# Patient Record
Sex: FEMALE | Race: OTHER | HISPANIC OR LATINO
[De-identification: names, ages, dates, MRNs, and addresses within clinical notes are randomized per-mention and may not be internally consistent; named-entity substitution may affect disease eponyms.]

---

## 2017-01-13 ENCOUNTER — TRANSCRIPTION ENCOUNTER (OUTPATIENT)
Age: 62
End: 2017-01-13

## 2020-07-14 ENCOUNTER — TRANSCRIPTION ENCOUNTER (OUTPATIENT)
Age: 65
End: 2020-07-14

## 2020-10-06 ENCOUNTER — APPOINTMENT (OUTPATIENT)
Dept: OBGYN | Facility: CLINIC | Age: 65
End: 2020-10-06
Payer: MEDICAID

## 2020-10-06 ENCOUNTER — LABORATORY RESULT (OUTPATIENT)
Age: 65
End: 2020-10-06

## 2020-10-06 VITALS
BODY MASS INDEX: 27.48 KG/M2 | HEIGHT: 60 IN | SYSTOLIC BLOOD PRESSURE: 134 MMHG | WEIGHT: 140 LBS | DIASTOLIC BLOOD PRESSURE: 86 MMHG

## 2020-10-06 PROBLEM — Z00.00 ENCOUNTER FOR PREVENTIVE HEALTH EXAMINATION: Status: ACTIVE | Noted: 2020-10-06

## 2020-10-06 PROCEDURE — 99386 PREV VISIT NEW AGE 40-64: CPT

## 2020-10-06 RX ORDER — LOSARTAN POTASSIUM 50 MG/1
50 TABLET, FILM COATED ORAL
Refills: 0 | Status: ACTIVE | COMMUNITY

## 2020-10-06 NOTE — COUNSELING
[Nutrition/ Exercise/ Weight Management] : nutrition, exercise, weight management [Body Image] : body image [Vitamins/Supplements] : vitamins/supplements [Breast Self Exam] : breast self exam [Bladder Hygiene] : bladder hygiene [STD (testing, results, tx)] : STD (testing, results, tx) [Vaccines] : vaccines

## 2020-10-06 NOTE — HISTORY OF PRESENT ILLNESS
[TextBox_4] : Patient is a 65 year old female who presents for her annual exam.  Reports no postmenopausal bleeding, abdominal or pelvic pain, change in discharge, change in bowel or bladder habits or any other concerns.  Due for pap and mammo.

## 2022-05-16 ENCOUNTER — EMERGENCY (EMERGENCY)
Facility: HOSPITAL | Age: 67
LOS: 1 days | Discharge: HOME | End: 2022-05-16
Attending: EMERGENCY MEDICINE
Payer: MEDICARE

## 2022-05-16 VITALS
DIASTOLIC BLOOD PRESSURE: 69 MMHG | SYSTOLIC BLOOD PRESSURE: 106 MMHG | WEIGHT: 132.06 LBS | TEMPERATURE: 98 F | HEART RATE: 64 BPM | RESPIRATION RATE: 16 BRPM | OXYGEN SATURATION: 98 % | HEIGHT: 61 IN

## 2022-05-16 PROCEDURE — 73110 X-RAY EXAM OF WRIST: CPT | Mod: 26,LT

## 2022-05-16 PROCEDURE — 73110 X-RAY EXAM OF WRIST: CPT

## 2022-05-16 PROCEDURE — 73100 X-RAY EXAM OF WRIST: CPT

## 2022-05-16 PROCEDURE — 73100 X-RAY EXAM OF WRIST: CPT | Mod: 26,59,LT

## 2022-05-16 PROCEDURE — 25605 CLTX DST RDL FX/EPHYS SEP W/: CPT | Mod: LT

## 2022-05-16 PROCEDURE — 25605 CLTX DST RDL FX/EPHYS SEP W/: CPT | Mod: 54,LT

## 2022-05-16 PROCEDURE — 99284 EMERGENCY DEPT VISIT MOD MDM: CPT | Mod: 57

## 2022-05-16 PROCEDURE — 99285 EMERGENCY DEPT VISIT HI MDM: CPT | Mod: 25

## 2022-05-16 RX ORDER — ACETAMINOPHEN 500 MG
650 TABLET ORAL ONCE
Refills: 0 | Status: COMPLETED | OUTPATIENT
Start: 2022-05-16 | End: 2022-05-16

## 2022-05-16 RX ADMIN — Medication 650 MILLIGRAM(S): at 11:37

## 2022-05-16 NOTE — ED PROVIDER NOTE - CLINICAL SUMMARY MEDICAL DECISION MAKING FREE TEXT BOX
67 year old female with S/P mechanical fall. Low suspicion of compression fracture. High suspicion of left wrist fracture. Neurovascular intact. Will perform X-Ray and reduction splint.

## 2022-05-16 NOTE — ED ADULT NURSE NOTE - OBJECTIVE STATEMENT
pt is a 68 y/o  female with c/o   left wrist pain s/p fall in a ladder . pt  states  she  hurt herself , denies any  LOC  with left wrist  injury and pain. + sensation but w/ limited  ROM on her left wrist.

## 2022-05-16 NOTE — ED PROVIDER NOTE - CARE PROVIDER_API CALL
Rg Thompson)  Orthopaedic Surgery; Sports Medicine  [address] [place] [address], [address] [place] [address]  Phone: [phone]  Fax: [phone]  Follow Up Time:

## 2022-05-16 NOTE — ED PROVIDER NOTE - NS ED ATTENDING STATEMENT MOD
This was a shared visit with the RAFAL. I reviewed and verified the documentation and independently performed the documented:

## 2022-05-16 NOTE — ED PROVIDER NOTE - MUSCULOSKELETAL, MLM
Neck supple, full range of motion. No spinal/paraspinal tenderness. Left wrist with deformity and distal radial tenderness. Radial/ulnar pulses intact 2+. Cap refill <2 seconds. Left elbow and left shoulder full range of motion without swelling or tenderness.

## 2022-05-16 NOTE — ED PROVIDER NOTE - PATIENT PORTAL LINK FT
You can access the FollowMyHealth Patient Portal offered by [facility] by registering at the following website: [email or web address]. By joining [employer]’s FollowMyHealth portal, you will also be able to view your health information using other applications (apps) compatible with our system.

## 2022-05-16 NOTE — ED PROVIDER NOTE - PROGRESS NOTE DETAILS
XR shows displaced fracture. Discussed with ortho. Will come down for reduction. Repeat Xr shows improved alignment. Splint on. Feels better. Will dc with ortho follow up within 3-5 days. Pt is well appearing walking with steady gait, stable for discharge and follow up without fail with medical doctor. I had a detailed discussion with the patient and/or guardian regarding the historical points, exam findings, and any diagnostic results supporting the discharge diagnosis. Pt educated on care and need for follow up. Strict return instructions and red flag signs and symptoms discussed with patient. Questions answered. Pt shows understanding of discharge information and agrees to follow.

## 2022-05-16 NOTE — ED PROVIDER NOTE - OBJECTIVE STATEMENT
67 year old female with history of hypertension brought into ED by daughter for evaluation S/P fall. She reports that she was climbing down from a stepladder when she fell backwards, landing on the buttocks. She further reports left back FOOSH, though she denies any head injury or loss of consciousness. She is now complaining of localized sharp pain to the left wrist. Patient states that she took ibuprofen 600mg prior to arrival. Denies numbness, tingling, focal weakness, back pain, or other complaints. NKDA.

## 2022-05-16 NOTE — ED PROVIDER NOTE - ATTENDING APP SHARED VISIT CONTRIBUTION OF CARE
l wrist fracture requires reduction splinting and probable surg by orthopedics - will refer. no other injury

## 2022-05-16 NOTE — ED PROVIDER NOTE - NSFOLLOWUPINSTRUCTIONS_ED_ALL_ED_FT
Follow up with the orthopedist within 3-5 days.    Tylenol 1000 mg orally every 6 hours as needed for pain/fever (max 4000 mg in 24 hours)   For pain you can take over the counter Ibuprofen 600 mg orally every 6 hours as needed for pain. Take medication with food.     If you experience any new or worsening symptoms or if you are concerned you can always come back to the emergency for a re-evaluation.

## 2022-05-16 NOTE — ED ADULT TRIAGE NOTE - CHIEF COMPLAINT QUOTE
s/p fall with injury to left wrist. "I climbed a 6 step ladder to reach something and fell around 0830".

## 2022-05-16 NOTE — ED ADULT NURSE REASSESSMENT NOTE - NS ED NURSE REASSESS COMMENT FT1
1417 pm _  MD Joshi and [facility] San   reduced left  wrist  and cast applied t pt left arm pt  able to tolerated    well.

## 2022-05-17 PROBLEM — I10 ESSENTIAL (PRIMARY) HYPERTENSION: Chronic | Status: ACTIVE | Noted: 2022-05-16

## 2022-05-18 ENCOUNTER — EMERGENCY (EMERGENCY)
Facility: HOSPITAL | Age: 67
LOS: 1 days | Discharge: HOME | End: 2022-05-18
Attending: EMERGENCY MEDICINE
Payer: MEDICARE

## 2022-05-18 VITALS
SYSTOLIC BLOOD PRESSURE: 115 MMHG | HEART RATE: 67 BPM | OXYGEN SATURATION: 97 % | TEMPERATURE: 98 F | HEIGHT: 61 IN | RESPIRATION RATE: 16 BRPM | DIASTOLIC BLOOD PRESSURE: 72 MMHG

## 2022-05-18 PROCEDURE — 99282 EMERGENCY DEPT VISIT SF MDM: CPT

## 2022-05-18 NOTE — ED PROVIDER NOTE - CLINICAL SUMMARY MEDICAL DECISION MAKING FREE TEXT BOX
pt with tight splint, will loose & d/c home.  Spoke with ortho, will not place cast if hand is swollen

## 2022-05-18 NOTE — ED PROVIDER NOTE - OBJECTIVE STATEMENT
67 y.o. female fell off the ladder on Mon, sustained distal wrist fracture, splint applied.  Pt now c/o hand swelling, 2nd-4th fingers tingling.  Pt has an appt with ortho tomorrow

## 2022-05-18 NOTE — ED PROVIDER NOTE - MUSCULOSKELETAL, MLM
Spine appears normal, RUE-range of motion is limited-sugar tong splint noted, hand- edema, pulses intact, sensory intact Spine appears normal, RUE-range of motion is limited-sugar tong splint noted, hand- edema, pulses intact, sensory intact, no cyanosis

## 2022-05-18 NOTE — ED PROVIDER NOTE - PATIENT PORTAL LINK FT
You can access the FollowMyHealth Patient Portal offered by [facility] by registering at the following website: [email or web address]. By joining [employer]’s FollowMyHealth portal, you will also be able to view your health information using other applications (apps) compatible with our system.

## 2022-05-18 NOTE — ED ADULT NURSE NOTE - CAS TRG GEN SKIN CONDITION

## 2022-05-19 ENCOUNTER — APPOINTMENT (OUTPATIENT)
Age: 67
End: 2022-05-19

## 2022-05-19 VITALS — SYSTOLIC BLOOD PRESSURE: 139 MMHG | HEART RATE: 57 BPM | DIASTOLIC BLOOD PRESSURE: 81 MMHG

## 2022-05-19 PROCEDURE — 25600 CLTX DST RDL FX/EPHYS SEP WO: CPT | Mod: LT

## 2022-05-19 PROCEDURE — 99205 OFFICE O/P NEW HI 60 MIN: CPT | Mod: 25

## 2022-05-19 NOTE — HISTORY OF PRESENT ILLNESS
[de-identified] : Ms. PAUL BURNS is a 67 year woman presents today for left wrist injury on 5/16/22. Fell backwards of a ladder and landed on her left wrist.  She initially went to [facility] where she was placed in a sugar-tong splint.  She denies any numbness or tingling in her fingers.  She is here today with her daughter.\par

## 2022-05-19 NOTE — DISCUSSION/SUMMARY
[de-identified] : 67-year-old woman with left extra-articular distal radius fracture\par -The risks and benefits of operative versus nonoperative management were discussed at length with the patient. The patient shows a good understanding of the injury and treatment options. They would like to move forward with nonoperative treatment. \par -Nonweightbearing left upper extremity\par -Short arm cast left wrist, cast care instructions were reviewed with the patient and the patient's daughter\par -Range of motion fingers elbow and shoulder.  Exercises were demonstrated in the office\par -Tylenol for pain\par -Follow-up in 6 weeks with x-rays of the left wrist out of the cast\par -All of the patient's questions and concerns were addressed.\par

## 2022-05-19 NOTE — PHYSICAL EXAM
[de-identified] : The patient is sitting comfortably in the exam room. \par left wrist.\par -Skin is intact, swelling, ecchymosis\par -Pronation 45, supination 45\par -Tenderness to palpation over the distal radius\par -Able to make a fist\par -Sensation is intact median, radial, ulnar, axillary nerves\par -Motor is intact median, radial, ulnar, axillary nerves\par -Hand is warm and well-perfused, Palpable radial and ulnar pulses [de-identified] : X-rays of the left wrist from 5/16/2022 show an extra-articular distal radius fracture with good overall height and inclination.  There is some dorsal angulation on the lateral view.  The patient is in a splint\par \par New x-rays of the left wrist were taken today in the office following application of short arm cast.  X-rays show good overall alignment of the distal radius without displacement from previous images.  There is still good radial height and inclination.  On the lateral there is still some dorsal angulation.

## 2022-06-30 ENCOUNTER — APPOINTMENT (OUTPATIENT)
Dept: ORTHOPEDIC SURGERY | Facility: CLINIC | Age: 67
End: 2022-06-30

## 2022-06-30 DIAGNOSIS — S52.502A UNSPECIFIED FRACTURE OF THE LOWER END OF LEFT RADIUS, INITIAL ENCOUNTER FOR CLOSED FRACTURE: ICD-10-CM

## 2022-06-30 PROCEDURE — 99213 OFFICE O/P EST LOW 20 MIN: CPT | Mod: 1L,24

## 2022-06-30 PROCEDURE — 73110 X-RAY EXAM OF WRIST: CPT | Mod: 1L,LT

## 2022-09-01 ENCOUNTER — NON-APPOINTMENT (OUTPATIENT)
Age: 67
End: 2022-09-01

## 2022-09-01 ENCOUNTER — APPOINTMENT (OUTPATIENT)
Dept: ORTHOPEDIC SURGERY | Facility: CLINIC | Age: 67
End: 2022-09-01

## 2022-09-01 DIAGNOSIS — S52.572D OTHER INTRAARTICULAR FRACTURE OF LOWER END OF LEFT RADIUS, SUBSEQUENT ENCOUNTER FOR CLOSED FRACTURE WITH ROUTINE HEALING: ICD-10-CM

## 2022-09-15 ENCOUNTER — APPOINTMENT (OUTPATIENT)
Dept: ORTHOPEDIC SURGERY | Facility: CLINIC | Age: 67
End: 2022-09-15

## 2022-09-15 PROBLEM — S52.572D OTHER CLOSED INTRA-ARTICULAR FRACTURE OF DISTAL END OF LEFT RADIUS WITH ROUTINE HEALING, SUBSEQUENT ENCOUNTER: Status: ACTIVE | Noted: 2022-05-19

## 2022-09-15 NOTE — PHYSICAL EXAM
[de-identified] : The patient is sitting comfortably in the exam room. \par left wrist.\par -Skin is intact, swelling, ecchymosis\par -Pronation 45, supination 45\par -Tenderness to palpation over the distal radius\par -Able to make a fist\par -Sensation is intact median, radial, ulnar, axillary nerves\par -Motor is intact median, radial, ulnar, axillary nerves\par -Hand is warm and well-perfused, Palpable radial and ulnar pulses [de-identified] : Xrays of the left wrist were taken in the office today, 9/1/22.

## 2022-09-15 NOTE — HISTORY OF PRESENT ILLNESS
[de-identified] : Patient is a 67 year-old female who presents to the office today for re-evaluation of a left wrist injury that occurred on 05/16/2022. She states since her last visit she is feeling

## 2022-09-15 NOTE — HISTORY OF PRESENT ILLNESS
[de-identified] : Patient is a 67 year-old female who presents to the office today for re-evaluation of a left wrist injury that occurred on 05/16/2022.

## 2022-09-15 NOTE — DISCUSSION/SUMMARY
[de-identified] : 67-year-old woman with left extra-articular distal radius fracture, approximately 6 weeks out.\par \par -Work on making a fist and curling finger for ROM\par -Removable wrist brace\par -Follow-up in 2 months with x-rays of the left wrist \par -All of the patient's questions and concerns were addressed.\par

## 2022-09-15 NOTE — PHYSICAL EXAM
[de-identified] : The patient is sitting comfortably in the exam room. \par left wrist.\par -Skin is intact, swelling, ecchymosis\par -Pronation 45, supination 45\par -Tenderness to palpation over the distal radius\par -Able to make a fist\par -Sensation is intact median, radial, ulnar, axillary nerves\par -Motor is intact median, radial, ulnar, axillary nerves\par -Hand is warm and well-perfused, Palpable radial and ulnar pulses

## 2022-09-27 PROBLEM — S52.502A DISTAL RADIUS FRACTURE, LEFT: Status: ACTIVE | Noted: 2022-05-19

## 2022-09-27 NOTE — PHYSICAL EXAM
[de-identified] : The patient is sitting comfortably in the exam room. \par left wrist.\par -Skin is intact, swelling, ecchymosis\par -Pronation 45, supination 45\par -Tenderness to palpation over the distal radius\par -Able to make a fist\par -Sensation is intact median, radial, ulnar, axillary nerves\par -Motor is intact median, radial, ulnar, axillary nerves\par -Hand is warm and well-perfused, Palpable radial and ulnar pulses [de-identified] : Xrays of the left wrist were taken in the office today, 6/30/22.  AP oblique and lateral.  X-rays show good bone healing at the distal radius fracture.  There is some dorsal angulation, shortening, and loss of inclination.

## 2022-09-27 NOTE — DISCUSSION/SUMMARY
[de-identified] : 67-year-old woman with left extra-articular distal radius fracture, approximately 6 weeks out.\par \par -Patient was held with the patient regarding the importance of working on range of motion of her fingers to regain optimal hand function.  Range of motion exercises at the DIP, PIP, MCP were demonstrated in the office today\par -Occupational Therapy for range of motion exercises and strengthening\par -Removable wrist brace\par -Follow-up in 2 months with x-rays of the left wrist \par -All of the patient's questions and concerns were addressed.\par

## 2022-09-27 NOTE — HISTORY OF PRESENT ILLNESS
[de-identified] : Ms. PAUL BURNS is a 67 y.o. woman who presents today for her left distal radius fracture treated nonoperatively.  The injury occurred on 5/16/22. She is here for cast removal. She states since her last visit she is feeling better but still has pain with pronating her wrist.

## 2023-03-31 ENCOUNTER — NON-APPOINTMENT (OUTPATIENT)
Age: 68
End: 2023-03-31

## 2023-06-08 ENCOUNTER — APPOINTMENT (OUTPATIENT)
Dept: OBGYN | Facility: CLINIC | Age: 68
End: 2023-06-08
Payer: MEDICARE

## 2023-06-08 VITALS
DIASTOLIC BLOOD PRESSURE: 60 MMHG | BODY MASS INDEX: 25.58 KG/M2 | OXYGEN SATURATION: 97 % | WEIGHT: 131 LBS | SYSTOLIC BLOOD PRESSURE: 133 MMHG | HEART RATE: 68 BPM

## 2023-06-08 DIAGNOSIS — Z01.419 ENCOUNTER FOR GYNECOLOGICAL EXAMINATION (GENERAL) (ROUTINE) W/OUT ABNORMAL FINDINGS: ICD-10-CM

## 2023-06-08 PROCEDURE — 99397 PER PM REEVAL EST PAT 65+ YR: CPT

## 2023-09-03 ENCOUNTER — NON-APPOINTMENT (OUTPATIENT)
Age: 68
End: 2023-09-03

## 2023-10-07 ENCOUNTER — NON-APPOINTMENT (OUTPATIENT)
Age: 68
End: 2023-10-07

## 2024-08-21 ENCOUNTER — NON-APPOINTMENT (OUTPATIENT)
Age: 69
End: 2024-08-21
